# Patient Record
Sex: MALE | Race: WHITE
[De-identification: names, ages, dates, MRNs, and addresses within clinical notes are randomized per-mention and may not be internally consistent; named-entity substitution may affect disease eponyms.]

---

## 2019-11-15 ENCOUNTER — HOSPITAL ENCOUNTER (EMERGENCY)
Dept: HOSPITAL 35 - ER | Age: 64
Discharge: HOME | End: 2019-11-15
Payer: COMMERCIAL

## 2019-11-15 VITALS — DIASTOLIC BLOOD PRESSURE: 96 MMHG | SYSTOLIC BLOOD PRESSURE: 158 MMHG

## 2019-11-15 VITALS — BODY MASS INDEX: 31.5 KG/M2 | HEIGHT: 70 IN | WEIGHT: 220 LBS

## 2019-11-15 DIAGNOSIS — R41.0: Primary | ICD-10-CM

## 2019-11-15 DIAGNOSIS — Z90.49: ICD-10-CM

## 2019-11-15 DIAGNOSIS — Z88.0: ICD-10-CM

## 2019-11-15 DIAGNOSIS — F17.210: ICD-10-CM

## 2019-11-15 DIAGNOSIS — G43.909: ICD-10-CM

## 2019-11-15 DIAGNOSIS — F32.9: ICD-10-CM

## 2019-11-15 LAB
ALBUMIN SERPL-MCNC: 4.1 G/DL (ref 3.4–5)
ALT SERPL-CCNC: 18 U/L (ref 30–65)
ANION GAP SERPL CALC-SCNC: 8 MMOL/L (ref 7–16)
AST SERPL-CCNC: 18 U/L (ref 15–37)
BASOPHILS NFR BLD AUTO: 0.7 % (ref 0–2)
BILIRUB SERPL-MCNC: 0.6 MG/DL
BILIRUB UR-MCNC: NEGATIVE MG/DL
BUN SERPL-MCNC: 19 MG/DL (ref 7–18)
CALCIUM SERPL-MCNC: 8.8 MG/DL (ref 8.5–10.1)
CHLORIDE SERPL-SCNC: 102 MMOL/L (ref 98–107)
CO2 SERPL-SCNC: 27 MMOL/L (ref 21–32)
COLOR UR: YELLOW
CREAT SERPL-MCNC: 1.1 MG/DL (ref 0.7–1.3)
EOSINOPHIL NFR BLD: 1.8 % (ref 0–3)
ERYTHROCYTE [DISTWIDTH] IN BLOOD BY AUTOMATED COUNT: 14 % (ref 10.5–14.5)
GLUCOSE SERPL-MCNC: 109 MG/DL (ref 74–106)
GRANULOCYTES NFR BLD MANUAL: 72.8 % (ref 36–66)
HCT VFR BLD CALC: 41.4 % (ref 42–52)
HGB BLD-MCNC: 13.6 GM/DL (ref 14–18)
KETONES UR STRIP-MCNC: NEGATIVE MG/DL
LIPASE: 124 U/L (ref 73–393)
LYMPHOCYTES NFR BLD AUTO: 18.2 % (ref 24–44)
MCH RBC QN AUTO: 28.9 PG (ref 26–34)
MCHC RBC AUTO-ENTMCNC: 32.7 G/DL (ref 28–37)
MCV RBC: 88.2 FL (ref 80–100)
MONOCYTES NFR BLD: 6.5 % (ref 1–8)
NEUTROPHILS # BLD: 5.1 THOU/UL (ref 1.4–8.2)
PLATELET # BLD: 203 THOU/UL (ref 150–400)
POTASSIUM SERPL-SCNC: 4 MMOL/L (ref 3.5–5.1)
PROT SERPL-MCNC: 7 G/DL (ref 6.4–8.2)
RBC # BLD AUTO: 4.7 MIL/UL (ref 4.5–6)
RBC # UR STRIP: (no result) /UL
SODIUM SERPL-SCNC: 137 MMOL/L (ref 136–145)
SP GR UR STRIP: <= 1.005 (ref 1–1.03)
TROPONIN I SERPL-MCNC: <0.06 NG/ML (ref ?–0.06)
URINE CLARITY: CLEAR
URINE GLUCOSE-RANDOM*: NEGATIVE
URINE LEUKOCYTES-REFLEX: NEGATIVE
URINE NITRITE-REFLEX: NEGATIVE
URINE PROTEIN (DIPSTICK): NEGATIVE
UROBILINOGEN UR STRIP-ACNC: 0.2 E.U./DL (ref 0.2–1)
WBC # BLD AUTO: 7 THOU/UL (ref 4–11)

## 2019-11-15 NOTE — EKG
[place] [address] [facility] [address]  [address]
Phone:  [phone]                    ELECTROCARDIOGRAM REPORT      
_______________________________________________________________________________
 
Name:       JIMI MOBLEY                 Room #:                     REG ER  
M.R.#:      [id number]     Account #:      [id number]  
Admission:  11/15/19    Attend Phys:                          
Discharge:              Date of Birth:  [birth year]  
                                                          Report #: [id number]
   [id number]
_______________________________________________________________________________
THIS REPORT FOR:   //name//                          
 
                         [facility] ED
                                       
Test Date:    2019-11-15               Test Time:    14:01:24
Pat Name:     JIMI MOBLEY              Department:   
Patient ID:   [id number]            Room:          
Gender:                               Technician:   [id number]
:          1955               Requested By: Andrew Buchanan
Order Number: [id number] MD:   Blayne Chawla
                                 Measurements
Intervals                              Axis          
Rate:         54                       P:            2
MN:           58                       QRS:          -3
QRSD:         112                      T:            23
QT:           459                                    
QTc:          435                                    
                           Interpretive Statements
Sinus bradycardia
Poor R wave progression
Compared to ECG 2008 07:09:23
No significant change was found
Electronically Signed On 11- 16:22:50 CST by Blayne Chawla
[email or web address]
 
 
 
 
 
 
 
 
 
 
 
 
 
 
 
 
 
 
 
  <ELECTRONICALLY SIGNED>
   By: Blayne Chawla MD, [facility]   
  11/15/19     1622
D: 11/15/19 1401                           _____________________________________
T: 11/15/19 1401                           Blayne Chawla MD, FACC     /EPI

## 2020-11-24 ENCOUNTER — HOSPITAL ENCOUNTER (OUTPATIENT)
Dept: HOSPITAL 35 - LAB | Age: 65
End: 2020-11-24
Attending: FAMILY MEDICINE
Payer: COMMERCIAL

## 2020-11-24 DIAGNOSIS — Z20.828: Primary | ICD-10-CM

## 2020-11-30 ENCOUNTER — HOSPITAL ENCOUNTER (OUTPATIENT)
Dept: HOSPITAL 35 - LAB | Age: 65
End: 2020-11-30
Attending: FAMILY MEDICINE
Payer: COMMERCIAL

## 2020-11-30 DIAGNOSIS — Z20.828: Primary | ICD-10-CM

## 2021-08-16 ENCOUNTER — HOSPITAL ENCOUNTER (INPATIENT)
Dept: HOSPITAL 35 - ER | Age: 66
LOS: 2 days | Discharge: HOME | DRG: 72 | End: 2021-08-18
Attending: INTERNAL MEDICINE | Admitting: INTERNAL MEDICINE
Payer: COMMERCIAL

## 2021-08-16 VITALS — SYSTOLIC BLOOD PRESSURE: 158 MMHG | DIASTOLIC BLOOD PRESSURE: 89 MMHG

## 2021-08-16 VITALS — SYSTOLIC BLOOD PRESSURE: 141 MMHG | DIASTOLIC BLOOD PRESSURE: 84 MMHG

## 2021-08-16 VITALS — DIASTOLIC BLOOD PRESSURE: 89 MMHG | SYSTOLIC BLOOD PRESSURE: 128 MMHG

## 2021-08-16 VITALS — WEIGHT: 212 LBS | BODY MASS INDEX: 30.35 KG/M2 | HEIGHT: 70 IN

## 2021-08-16 VITALS — SYSTOLIC BLOOD PRESSURE: 145 MMHG | DIASTOLIC BLOOD PRESSURE: 92 MMHG

## 2021-08-16 DIAGNOSIS — Z20.822: ICD-10-CM

## 2021-08-16 DIAGNOSIS — Z90.49: ICD-10-CM

## 2021-08-16 DIAGNOSIS — G43.909: ICD-10-CM

## 2021-08-16 DIAGNOSIS — G40.309: ICD-10-CM

## 2021-08-16 DIAGNOSIS — F32.9: ICD-10-CM

## 2021-08-16 DIAGNOSIS — R41.0: ICD-10-CM

## 2021-08-16 DIAGNOSIS — F03.90: ICD-10-CM

## 2021-08-16 DIAGNOSIS — M19.90: ICD-10-CM

## 2021-08-16 DIAGNOSIS — G93.41: Primary | ICD-10-CM

## 2021-08-16 DIAGNOSIS — Z88.0: ICD-10-CM

## 2021-08-16 LAB
ALBUMIN SERPL-MCNC: 3.8 G/DL (ref 3.4–5)
ALT SERPL-CCNC: 22 U/L (ref 16–63)
ANION GAP SERPL CALC-SCNC: 6 MMOL/L (ref 7–16)
AST SERPL-CCNC: 19 U/L (ref 15–37)
BASOPHILS NFR BLD AUTO: 1.1 % (ref 0–2)
BILIRUB SERPL-MCNC: 0.6 MG/DL (ref 0.2–1)
BUN SERPL-MCNC: 17 MG/DL (ref 7–18)
CALCIUM SERPL-MCNC: 9 MG/DL (ref 8.5–10.1)
CHLORIDE SERPL-SCNC: 104 MMOL/L (ref 98–107)
CO2 SERPL-SCNC: 28 MMOL/L (ref 21–32)
CREAT SERPL-MCNC: 1.1 MG/DL (ref 0.7–1.3)
EOSINOPHIL NFR BLD: 4.4 % (ref 0–3)
ERYTHROCYTE [DISTWIDTH] IN BLOOD BY AUTOMATED COUNT: 13.4 % (ref 10.5–14.5)
FOLATE SERPL-MCNC: 18.1 NG/ML (ref 8.6–58.9)
GLUCOSE SERPL-MCNC: 107 MG/DL (ref 74–106)
GRANULOCYTES NFR BLD MANUAL: 62.9 % (ref 36–66)
HCT VFR BLD CALC: 40.2 % (ref 42–52)
HGB BLD-MCNC: 13.5 GM/DL (ref 14–18)
LYMPHOCYTES NFR BLD AUTO: 22.3 % (ref 24–44)
MCH RBC QN AUTO: 29.7 PG (ref 26–34)
MCHC RBC AUTO-ENTMCNC: 33.7 G/DL (ref 28–37)
MCV RBC: 88.2 FL (ref 80–100)
MONOCYTES NFR BLD: 9.3 % (ref 1–8)
NEUTROPHILS # BLD: 3.5 THOU/UL (ref 1.4–8.2)
PLATELET # BLD: 222 THOU/UL (ref 150–400)
POTASSIUM SERPL-SCNC: 3.8 MMOL/L (ref 3.5–5.1)
PROT SERPL-MCNC: 6.6 G/DL (ref 6.4–8.2)
RBC # BLD AUTO: 4.56 MIL/UL (ref 4.5–6)
SODIUM SERPL-SCNC: 138 MMOL/L (ref 136–145)
VIT B12 SERPL-MCNC: 433 PG/ML (ref 193–986)
WBC # BLD AUTO: 5.6 THOU/UL (ref 4–11)

## 2021-08-16 PROCEDURE — 10195: CPT

## 2021-08-16 NOTE — NUR
PT IS A/O X4 AND IS FORGETFUL. UP AD KYLAH, ON ROOM AIR. VSS. AFEBRILE. DENIES
C/O PAIN OR DISCOMFORT. ADMISSION COMPLETE. PT HAS BEEN EDUCATED ON USE OF
CALL LIGHT AND BED CONTROL. CURRENTLY EATING A SNACK BUT WILL BE NPO AT
MIDNIGHT AWAITING PROCEDURES IN THE AM. CALL LIGHT IS WITHIN REACH. PT IS
PLEASANT AND COOPERATIVE. CONSENTS SIGNED AND IN THE CHART. WILL CONTINUE TO
MONITOR.

## 2021-08-16 NOTE — NUR
66 y/o male presenting to the ED on 8-16-21 for confusion for the past 2-3
days.  Per the wife reports is that the pt has been unable to remember where
he is and what he's doing.  Per the ED Triage assessment the patient shows as
vaccinated with Moderna and now ID NOW testing is not noted.  The patient is a
current patient of Dr. Ag.  Neurology has been consulted and the patient
has been admitted to the hospital under a hospitalist for AMS.  Upon last ED
assessment as with admission the patient is listed at A&O x4.  Of note patient
had similar episode on 11-15-19 and upon symptom resolution the patient was
discharged home from the ED at that time.
 
Wife Maria Isabel Bass at [phone] is listed as next of kin.  As plan of care
is developed upon MD assessment; CM will monitor for discharge needs as
patient currently lives with spouse Maria Isabel and is retired.

## 2021-08-16 NOTE — EKG
[address] [place]  [address]
Phone:  [phone]                    ELECTROCARDIOGRAM REPORT      
_______________________________________________________________________________
 
Name:       LEANDRAJIMI IGLESIA                 Room #:         170-11      ADM IN  
M.R.#:      [id number]     Account #:      [id number]  
Admission:  21    Attend Phys:    Dwayne Vasques MD      
Discharge:              Date of Birth:  [birth year]  
                                                          Report #: [id number]
   [id number]
_______________________________________________________________________________
                         [facility] ED
                                       
Test Date:    2021               Test Time:    09:53:01
Pat Name:     JIMI MOBLEY              Department:   
Patient ID:   [id number]            Room:         170
Gender:       M                        Technician:   DOMI DODSON
:          1955               Requested By: Elder Ireland
Order Number: [id number] MD:   Fabrice Leon
                                 Measurements
Intervals                              Axis          
Rate:         56                       P:            35
PA:           203                      QRS:          17
QRSD:         97                       T:            55
QT:           434                                    
QTc:          419                                    
                           Interpretive Statements
Sinus rhythm
Compared to ECG 11/15/2019 14:01:24
ST (T wave) deviation now present
Sinus bradycardia no longer present
Poor R-wave progression no longer present
Electronically Signed On 2021 15:05:00 CDT by Fabrice Loen
[email or web address]
 
 
 
 
 
 
 
 
 
 
 
 
 
 
 
 
 
 
 
 
  <ELECTRONICALLY SIGNED>
   By: Fabrice Leon MD, [facility]    
  21     1505
D: 21 0953                           _____________________________________
T: 21 0953                           Fabrice Leon MD, [facility]      /EPI

## 2021-08-17 VITALS — DIASTOLIC BLOOD PRESSURE: 87 MMHG | SYSTOLIC BLOOD PRESSURE: 150 MMHG

## 2021-08-17 VITALS — SYSTOLIC BLOOD PRESSURE: 125 MMHG | DIASTOLIC BLOOD PRESSURE: 78 MMHG

## 2021-08-17 VITALS — SYSTOLIC BLOOD PRESSURE: 144 MMHG | DIASTOLIC BLOOD PRESSURE: 89 MMHG

## 2021-08-17 VITALS — SYSTOLIC BLOOD PRESSURE: 132 MMHG | DIASTOLIC BLOOD PRESSURE: 80 MMHG

## 2021-08-17 NOTE — NUR
ORDERS RECEIVED FOR PT EVAL AND TREAT. Pt ADMITTED FOR AMS,
INTERMITTENT/TRANSIENT CONFUSION. Pt LIVES W/ WIFE IN HOME W/ 1-2 HERVE AND 6-10
STEPS INSIDE BETWEEN VARIOUS LEVELS BUT LIVES MOSTLY ON MAIN LEVEL. NO AD.
DRIVES. WALKS 2-2.5 MILES/DAY WITH HIS WIFE IN THEIR NEIGHBORHOOD. DENIED
RECENT FALLS. Pt HAD EEG THIS AM AND PLANNING FOR MRI LATER TODAY. Pt HAS BEEN
UP AD KYLAH IN ROOM; WIFE CONFIRMED. Pt DOES NOT HAVE PT CONCERNS AT THIS TIME.
DECLINES NEED FOR PT. ACUTE PT TO SIGN OFF.

## 2021-08-17 NOTE — NUR
ASSUMED CARE AT 0700. PATIENT IS ALERT AND ORIENTED X4, BUT HAS PERIODS OF
FORGETFULNESS. PATIENT IS NPO. PATIENT HAS S.L. IN HIS RIGHT AC. PATIENT HAS
HX OF SZ. PLAN EEG THIS A.M. FOLLOWED BY MRI. OT HERE TO SEE PATIENT R/T
ADL'S. PATIENT IS UP WITH SBA. PATIENT IS TO HAVE PT/OT/ST EVALS TODAY. FALL
AND SAFETY PROTOCOLS IN PLACE. UP WITH SBA TO BATHROOM TO VOID JAMIL COLORED
URINE. WILL CONTINUE TO MONITER.

## 2021-08-18 VITALS — SYSTOLIC BLOOD PRESSURE: 153 MMHG | DIASTOLIC BLOOD PRESSURE: 94 MMHG

## 2021-08-18 VITALS — SYSTOLIC BLOOD PRESSURE: 135 MMHG | DIASTOLIC BLOOD PRESSURE: 85 MMHG

## 2021-08-18 VITALS — DIASTOLIC BLOOD PRESSURE: 94 MMHG | SYSTOLIC BLOOD PRESSURE: 153 MMHG

## 2021-08-18 NOTE — NUR
ASSUMED PATIENT CARE TODAY AT 1300. AWAITING NEURO PHYSICIAN TO DISCHARGE
PATIENT. PHYSICIAN GAVE ORDERS TO DISCHARGE PATIENT HOME. WILL INCREASE KEPPRA
TO 750MG. PRESCRIPTIONS SENT TO PATIENTS PHARMACY. DISCHARGE INSTRUCTIONS
GIVEN TO PATIENT AND WIFE. VERBALIZED UNDERSTANDING. PATIENT LEFT THE BUILDING
AT 1630 VIA VEHICLE.

## 2021-08-18 NOTE — NUR
ASSUMED PT CARE THIS AM. PT IS ALERT & ORIENTED X4 BUT FORGETFUL AT TIMES. PT
HAS IV SITE ON L AC SALINE LOCKED. PT IS ON ROOM AIR. NO SEIZURE NOTED THIS
AM. PT DENIES PAIN, NAUSEA AND VOMITING THIS AM. PT TOLERATED MEDICATION AND
DIET WELL. WILL CONTINUE TO MONITOR PT. FOLLOW POC.

## 2021-08-18 NOTE — NUR
ASSESSED AT START OF SHIFT PT RESTING IN BED. REQUESTED NIGHT TIME
MEDICATIONS. ON CALL NP NOTIFIED AND EEVENING MEDS GIVEN. PT UPAD KYLAH TO THE
BATHROOM. FALL EDUCATION PROVIDED. URINAL AT BEDSIDE. NO FURTHER SIGNS OF
DISCOMFORT WILL CONT TO MONITOR.

## 2021-08-18 NOTE — HC
[facility]
[address]   [address]                     CONSULTATION                  
_______________________________________________________________________________
 
Name:       JIMI MOBLEY                 Room #:         446-P       ADM IN  
M.R.#:      [id number]                       Account #:      [id number]  
Admission:  08/16/21    Attend Phys:    Dwayne Vasques MD      
Discharge:              Date of Birth:  [birth year]  
                                                          Report #: [id number]
                                                                    [id number] 
_______________________________________________________________________________
THIS REPORT FOR:  
 
cc:  Blaze Ag MD, Rene P. MD Khosla, Parveen K. MD                                         ~
 
DATE OF SERVICE: 08/16/2021
 
HISTORY OF PRESENT ILLNESS:  This 65-year-old male patient was evaluated by me 
for altered mental status.  The patient says that he has been confused for a few
days.  Family states that the confusion may be much longer, but the patient 
insists that it is from the last few days only.  He sometimes forgets what date 
it is.  Yesterday could not recall the president's name.  He denies any unusual 
stress.  He said he is not having trouble with anxiety or depressions in the 
past and is not on any medications for that, but the record says that he does 
have a history of depression.  He does have a history of migraine but he takes 
Imitrex virtually every day.  He does take Zoloft.  He does take Zolpidem.  He 
had appendectomy.
 
REVIEW OF SYSTEMS:  Otherwise, he says he is reasonably healthy, and a 14-point 
review of systems mostly unremarkable.
 
PAST MEDICAL HISTORY:  Positive for migraine, but history is poorly defined.
 
FAMILY HISTORY:  Noncontributory.
 
SOCIAL HISTORY:  He says he does not drink alcohol, but smokes a cigar once in a
while.
 
PHYSICAL EXAMINATION:  He is alert.  He is responsive.  He could tell me what 
month it is, could not tell me what day it is.  Today, he was able to name the 
president and the one before, but it took him a long time to answer most of the 
questions.  Cranial nerve examination II-XII looks unremarkable.  Neuromuscular 
examination is unremarkable.  There is no cerebellar sign.  I could not look at 
the patient's fundus.  There is no meningeal sign.  There is no carotid bruit.  
There is no thyroid mass.  He is a well-developed individual.  His pulses are 
palpable.  He has no edema, cyanosis or jaundice.  Cardiac and respiratory 
examination is unremarkable.  CT scan showed some atrophy, but nothing acute.
 
IMPRESSION:  Although the history is somewhat difficult, but looks like he has a
pretty significant confusion from the last 2-3 days.  I will get an MRI, EEG and
neuropsychological testing done.  I will send a sed rate in this patient and the
further workup will depend upon the outcome of these testing.  I discussed with 
the patient, with Emergency Room physician and I have discussed with him and his
options in detail.
 
 
 
[address]   [address]                     CONSULTATION                  
_______________________________________________________________________________
 
Name:       JIMI MOBLEY                 Room #:         446-P       ADM IN  
M.R.#:      [id number]                       Account #:      [id number]  
Admission:  08/16/21    Attend Phys:    Dwayne Vasques MD      
Discharge:              Date of Birth:  [birth year]  
                                                          Report #: [id number]
                                                                    [id number] 
_______________________________________________________________________________
 
 
Thank you very much for this referral.
 
 
 
 
 
 
 
 
 
 
 
 
 
 
 
 
 
 
 
 
 
 
 
 
 
 
 
 
 
 
 
 
 
 
 
 
 
 
 
 
 
  <ELECTRONICALLY SIGNED>
   By: Diogo Rockwell MD         
  08/18/21     1222
D: 08/16/21 1856                           _____________________________________
T: 08/17/21 0032                           Diogo Rockwell MD           /nt

## 2021-08-18 NOTE — NUR
ASSESSMENT: CM REVIEWED CHART AND MET WITH PATIENT AT THE BEDSIDE. PT WAS
ADMITTED DUE TO FORGETFULLNESS AND CONCERNS ABOUT DISORIENTATION. CM MET WITH
PATIENT AT THE BEDSIDE. PT REPORTS THAT HE CURRENTLY LIVES AT HOME WITH HIS
WIFE AND HIS SON. PT REPORTS LIVING IN A HOUSE WITH ONE STEP TO ENTER AND NO
STEPS HE HAS TO USE ONCE INSIDE. PT REPORTS BEING FULLY INDEPENDENT WITH ADLS
AND AMBULATION. PT STATES HE HAS NEVER HAD HH IN THE PAST OR BEEN TO SNF.
NEUROLOGY IS ON THE CASE AND IF PT IS CLEARED BY NEURO TODAY THEN HE WILL
POSSIBLY DISCHARGE HOME. PT REPORTS HE SHOULD HAVE NO NEEDS FROM CM. CM WILL
CONTINUE TO FOLLOW.

## 2021-08-23 NOTE — EEG
[facility]
[address]
[place]  [address]                      ELECTROENCEPHALOGRAM          
_______________________________________________________________________________
 
Name:       JIMI MOBLEY                  Room #:         446-P       DIS IN  
M.R.#:      [id number]      Account #:      [id number]  
Admission:  08/16/21     Attend Phys:    Dwayne Vasques MD     
Discharge:  08/18/21     Date of Birth:  [birth year]  
                                                           Report #: [id number]
    [id number]
_______________________________________________________________________________
THIS REPORT FOR:   //name//                          
 
DATE OF SERVICE: 08/17/2021
 
This patient's EEG was dictated but they cannot find the report, so it is being 
redictated.
 
The EEG was done by using standard 10-20 system of electrode placement.  Both 
referential and sequential montages were used for recording.  Background 
activity is about 9 Hz.  The patient does become drowsy and that is associated 
with bilateral slowing.  The patient had multiple episodes of what looks like 
spike and slow wave activity.  It was not 3 Hz spike and slow wave activity.  It
was generalized spike and slow wave activity.  Photic stimulation was 
unremarkable.
 
IMPRESSION:  This patient's study demonstrated multiple episodes of spike and 
slow wave activity.  If clinically correlated, that finding would be consistent 
with the diagnosis of seizure disorder.
 
Thank you very much for this referral.
 
 
 
 
 
 
 
 
 
 
 
 
 
 
 
 
 
 
 
 
 
 
 
 
       <ELECTRONICALLY SIGNED>
   By: Diogo Rockwell MD         
  08/23/21     1323
D: 08/19/21 1110                           _____________________________________
T: 08/19/21 1204                           Diogo Rockwell MD           /nt